# Patient Record
Sex: FEMALE | Race: WHITE | NOT HISPANIC OR LATINO | Employment: PART TIME | URBAN - METROPOLITAN AREA
[De-identification: names, ages, dates, MRNs, and addresses within clinical notes are randomized per-mention and may not be internally consistent; named-entity substitution may affect disease eponyms.]

---
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
## 2022-05-02 NOTE — HISTORICAL [facility] CERNER
This is a historical note converted from Jose Alberto. Formatting and pictures may have been removed.  Please reference Jose Alberto for original formatting and attached multimedia. Chief Complaint  Fell at gym 2 weeks ago pain and sweeling to R knee.  History of Present Illness  She is a pleasant 16-year-old female who is a elite trampoline participant?who fell off the trampoline and landed?on a pole?about 2 weeks ago. ?She had immediate pain and swelling on the front of her right knee.? She is in rehab did some but has continued pain particularly with?deep squatting and and jumping. ?The pain is localized anteriorly.? She is using anti-inflammatories over-the-counter.? She has had no advanced imaging. ?She has had no injections. ?She has had no physical therapy  Review of Systems  No diabetes,?cardiac problems or chest pain,?pulmonary problems or asthma,?history of prior dvt or clotting problems,?sleep apnea.  Physical Exam  Vitals & Measurements  HT:?154.9?cm? WT:?65.5?kg?  Gen: WN, WD, NAD  Card/Res: NL breathing, +distal pulses  Abdomen: ND  Standing exam  stance: normal alignment, no significant leg-length discrepancy  gait: no limp  ?   Knee examination  - General comments: unremarkable appearance  ?   - Tenderness: Anterior tibial tubercle  ?   Knee??????????RIGHT??????????LEFT  Skin: ??????????Intact ??????????Intact  ROM:??????????0-100??????????0-130  Effusion:????? Neg ???????????? Neg  MJL TTP:????? Neg ???????????? Neg  LJL TTP: ?????? Neg ???????????? Neg  Juana:? ?Neg ???????????? Neg  Pat crep:?????? Neg ???????????????Neg  Patella TTPs: Neg ???????????????Neg  Patella grind: Neg??????????? ?Neg  Lachman: ?????Neg ????????????????????Neg  Pivot shift: ?????Neg ???????????? Neg  Valgus stress: Neg ???????????????Neg  Varus stress: Neg ???????????????Neg  Posterior drawer: Neg ??????????Neg  ?   N-V ????????????????????intact??????????intact  Hip:?????????????????????????nml?????????? nml  ?   Lower extremity  edema:Negative  Her extensor mechanism is intact.  Assessment/Plan  1.?Patellar tendonitis  She has a tendinitis of her patella tendon.? I have recommended?conservative measures: Anti-inflammatory medicines,?relative rest, and a CHO-Pat strap.? I will see her back if she has any trouble  Ordered:  Office/Outpatient Visit Level 3 New [id number] PC, Patellar tendonitis, [facility], 05/10/17 13:32:00 CDT  ?  Orders:  Clinic Follow-up PRN, 05/10/17 13:32:00 CDT, Future Order, [facility]  XR Knee Right 3 Views, Routine, 05/10/17 12:56:00 CDT, Other (please specify), None, Ambulatory, Rad Type, Pain, Not Scheduled, 05/10/17 12:56:00 CDT   Problem List/Past Medical History  No chronic problems  Historical  No historical problems  Procedure/Surgical History  0. None, Leg.  Medications  Advil 200 mg oral tablet, 400 mg, 2 tab(s), Oral, q4hr, PRN  Allergies  No Known Medication Allergies  Social History  Alcohol  Never  Tobacco  Never smoker  Diagnostic Results  Knee radiographs 5/10/2017: 3 views of the knee show?fragmentation near her tibial tubercle.

## 2022-09-17 ENCOUNTER — HISTORICAL (OUTPATIENT)
Dept: ADMINISTRATIVE | Facility: HOSPITAL | Age: 22
End: 2022-09-17

## 2022-11-01 ENCOUNTER — OFFICE VISIT (OUTPATIENT)
Dept: URGENT CARE | Facility: CLINIC | Age: 22
End: 2022-11-01
Payer: COMMERCIAL

## 2022-11-01 VITALS
TEMPERATURE: 99 F | OXYGEN SATURATION: 100 % | BODY MASS INDEX: 23.92 KG/M2 | RESPIRATION RATE: 17 BRPM | WEIGHT: 130 LBS | HEIGHT: 62 IN | SYSTOLIC BLOOD PRESSURE: 123 MMHG | DIASTOLIC BLOOD PRESSURE: 78 MMHG | HEART RATE: 61 BPM

## 2022-11-01 DIAGNOSIS — S61.311A LACERATION OF LEFT INDEX FINGER WITHOUT FOREIGN BODY WITH DAMAGE TO NAIL, INITIAL ENCOUNTER: Primary | ICD-10-CM

## 2022-11-01 PROCEDURE — 1159F PR MEDICATION LIST DOCUMENTED IN MEDICAL RECORD: ICD-10-PCS | Mod: CPTII,,, | Performed by: FAMILY MEDICINE

## 2022-11-01 PROCEDURE — 1160F RVW MEDS BY RX/DR IN RCRD: CPT | Mod: CPTII,,, | Performed by: FAMILY MEDICINE

## 2022-11-01 PROCEDURE — 3074F PR MOST RECENT SYSTOLIC BLOOD PRESSURE < 130 MM HG: ICD-10-PCS | Mod: CPTII,,, | Performed by: FAMILY MEDICINE

## 2022-11-01 PROCEDURE — 12001 RPR S/N/AX/GEN/TRNK 2.5CM/<: CPT | Mod: S$PBB,,, | Performed by: FAMILY MEDICINE

## 2022-11-01 PROCEDURE — 3074F SYST BP LT 130 MM HG: CPT | Mod: CPTII,,, | Performed by: FAMILY MEDICINE

## 2022-11-01 PROCEDURE — 3078F DIAST BP <80 MM HG: CPT | Mod: CPTII,,, | Performed by: FAMILY MEDICINE

## 2022-11-01 PROCEDURE — 3008F BODY MASS INDEX DOCD: CPT | Mod: CPTII,,, | Performed by: FAMILY MEDICINE

## 2022-11-01 PROCEDURE — 1159F MED LIST DOCD IN RCRD: CPT | Mod: CPTII,,, | Performed by: FAMILY MEDICINE

## 2022-11-01 PROCEDURE — 12001 LACERATION REPAIR: ICD-10-PCS | Mod: S$PBB,,, | Performed by: FAMILY MEDICINE

## 2022-11-01 PROCEDURE — 1160F PR REVIEW ALL MEDS BY PRESCRIBER/CLIN PHARMACIST DOCUMENTED: ICD-10-PCS | Mod: CPTII,,, | Performed by: FAMILY MEDICINE

## 2022-11-01 PROCEDURE — 99212 PR OFFICE/OUTPT VISIT, EST, LEVL II, 10-19 MIN: ICD-10-PCS | Mod: S$PBB,25,, | Performed by: FAMILY MEDICINE

## 2022-11-01 PROCEDURE — 99212 OFFICE O/P EST SF 10 MIN: CPT | Mod: S$PBB,25,, | Performed by: FAMILY MEDICINE

## 2022-11-01 PROCEDURE — 3078F PR MOST RECENT DIASTOLIC BLOOD PRESSURE < 80 MM HG: ICD-10-PCS | Mod: CPTII,,, | Performed by: FAMILY MEDICINE

## 2022-11-01 PROCEDURE — 3008F PR BODY MASS INDEX (BMI) DOCUMENTED: ICD-10-PCS | Mod: CPTII,,, | Performed by: FAMILY MEDICINE

## 2022-11-01 NOTE — PATIENT INSTRUCTIONS
Keep dry for at least 3 days then allow to slowly crust off. Should gradually smooth down during the healing process.  Tylenol or Motrin for discomfort.  Can also elevate above heart level.

## 2022-11-01 NOTE — PROGRESS NOTES
"Subjective:       Patient ID: Domenica Dinh is a 21 y.o. female.    Vitals:  height is 5' 2" (1.575 m) and weight is 59 kg (130 lb). Her temperature is 98.6 °F (37 °C). Her blood pressure is 123/78 and her pulse is 61. Her respiration is 17 and oxygen saturation is 100%.     Chief Complaint: Laceration (Laceration- left second digit- cut it on a slicer x today - last tdap was 2020)    Cut left 2nd digit on a slicer at work today.  No numbness. Bleeding controlled.  Tdap UTD.       Constitution: Negative for chills.   Respiratory:  Negative for cough.    Skin:  Positive for laceration.     Objective:      Physical Exam   Cardiovascular: Normal rate.   Pulmonary/Chest: Effort normal.   Abdominal: Normal appearance.   Neurological: no focal deficit. She is alert.   Skin: Capillary refill takes less than 2 seconds.         Comments: 1x1 cm V shaped flap of the tip of the L 2nd digit.  No FB.    Psychiatric: Mood normal.   Nursing note and vitals reviewed.      Assessment:       1. Laceration of left index finger without foreign body with damage to nail, initial encounter          Plan:         Laceration of left index finger without foreign body with damage to nail, initial encounter    Other orders  -     Laceration Repair          Laceration Repair    Date/Time: 11/1/2022 1:05 PM  Performed by: Franci Sandhu MD  Authorized by: Franci Sandhu MD   Body area: upper extremity  Location details: left index finger  Laceration length: 2 cm  Foreign bodies: no foreign bodies  Tendon involvement: none  Nerve involvement: none  Vascular damage: no    Patient sedated: no  Preparation: Patient was prepped and draped in the usual sterile fashion.  Irrigation solution: tap water  Amount of cleaning: standard  Debridement: none  Degree of undermining: none  Wound skin closure material used: dermabond.  Approximation: loose  Approximation difficulty: simple  Patient tolerance: Patient tolerated the procedure well with no " immediate complications